# Patient Record
Sex: FEMALE | Race: WHITE
[De-identification: names, ages, dates, MRNs, and addresses within clinical notes are randomized per-mention and may not be internally consistent; named-entity substitution may affect disease eponyms.]

---

## 2017-04-14 ENCOUNTER — HOSPITAL ENCOUNTER (OUTPATIENT)
Dept: HOSPITAL 45 - C.MRI | Age: 52
Discharge: HOME | End: 2017-04-14
Attending: FAMILY MEDICINE
Payer: COMMERCIAL

## 2017-04-14 DIAGNOSIS — R92.2: Primary | ICD-10-CM

## 2017-04-17 NOTE — MAMMOGRAPHY REPORT
BREAST MRI OF BOTH BREASTS : 4/14/2017

CLINICAL HISTORY: Here for short interval follow-up after benign MRI guided biopsy of the left breas
t August 2016.  





COMPARISON: Comparison is made to exams dated:  7/28/2016 breast MRI, 8/24/2016 MRI biopsy, 7/18/201
6 mammogram, 7/13/2016 mammogram, 5/30/2013 mammogram, and 3/14/2012 mammogram - [facility].   



Technique: The patient was placed prone in a dedicated breast imaging coil.  Precontrast axial T1-we
ighted, axial T2-weighted fat saturation, and axial T1-weighted fat saturation images were obtained.
  After the administration of 9 mL of Gadavist IV contrast, sequential T1-weighted fat saturation im
ages were obtained.  Subtraction images were obtained of the dynamic contrast enhanced sequences, an
d 3-D reformations were performed.  The [employer] software was used for kinetic analysis.



Findings:



There is mild background parenchymal enhancement involving bilateral breasts.  Clip artifact is seen
 within the left upper outer quadrant far superiorly at the site of prior benign MRI guided biopsy. 
 The biopsied linear area of enhancement is no longer evident.  The remainder of both breasts demons
trate no suspicious enhancing masses or other suspicious findings.  There are multiple small enhanci
ng foci seen scattered throughout both breasts, which are considered benign given the multiplicity a
nd bilaterality and are felt to represent normal background parenchymal enhancement.  



There is no evidence of axillary adenopathy.  The chest wall structures are negative.  Visualized ex
tremely soft tissues are grossly unremarkable.





IMPRESSION: ACR BI-RADS CATEGORY 2: BENIGN 

No MRI evidence of malignancy in either breast.  The previously seen linear area of enhancement in t
he left upper outer quadrant is no longer evident; this was previously biopsied and yielded benign p
athology.



Return to annual mammogram screening schedule is recommended, due July 2017.  







Lovely Ugarte M.D.  

/:4/15/2017 12:59:13  



Imaging Technologist: MRI Technologist, [facility]

letter sent: Normal 1/2  

BI-RADS Code: ACR BI-RADS Category 2: Benign

## 2017-06-28 ENCOUNTER — HOSPITAL ENCOUNTER (OUTPATIENT)
Dept: HOSPITAL 45 - C.RDSM | Age: 52
Discharge: HOME | End: 2017-06-28
Attending: FAMILY MEDICINE
Payer: COMMERCIAL

## 2017-06-28 DIAGNOSIS — M79.89: ICD-10-CM

## 2017-06-28 DIAGNOSIS — M79.645: Primary | ICD-10-CM

## 2017-06-28 NOTE — DIAGNOSTIC IMAGING REPORT
LEFT THIRD FINGER RADIOGRAPHS



CLINICAL HISTORY: Left third finger dislocation.    



COMPARISON: None



FINDINGS:  Alignment of the left third finger is anatomic. There is moderate

soft tissue swelling at the level of the PIP joint of the third digit. No

fracture is identified. 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

interphalangeal joint of the left third finger.



IMPRESSION: 



1. No acute fracture.



2. Anatomic alignment of the left third finger.



3. Moderate soft tissue swelling at the level of the PIP joint of the left third

finger.







Electronically signed by:  Jose Parkinson M.D.

6/28/2017 9:38 AM



Dictated Date/Time:  6/28/2017 9:37 AM

## 2017-08-08 ENCOUNTER — HOSPITAL ENCOUNTER (OUTPATIENT)
Dept: HOSPITAL 45 - C.MAMM | Age: 52
Discharge: HOME | End: 2017-08-08
Attending: FAMILY MEDICINE
Payer: COMMERCIAL

## 2017-08-08 DIAGNOSIS — Z12.31: Primary | ICD-10-CM

## 2017-08-09 NOTE — MAMMOGRAPHY REPORT
BILATERAL DIGITAL SCREENING MAMMOGRAM TOMOSYNTHESIS WITH CAD: 8/8/2017

CLINICAL HISTORY: Routine screening.  Patient has no complaints.  





TECHNIQUE:  Breast tomosynthesis in addition to standard 2D mammography was performed. Current study 
was also evaluated with a Computer Aided Detection (CAD) system.  



COMPARISON: Comparison is made to exams dated:  4/14/2017 breast MRI, 8/24/2016 mammogram, 8/24/2016 
MRI biopsy, 7/28/2016 breast MRI, 7/18/2016 mammogram, and 7/13/2016 mammogram - [facility].   



BREAST COMPOSITION:  The tissue of both breasts is heterogeneously dense, which may obscure small mas
ses.  



FINDINGS:  There is a stable dumbbell-shaped metallic biopsy marker in the left upper outer posterior
 breast.  No new suspicious mass, architectural distortion or cluster of microcalcifications is seen.
  



IMPRESSION:  ACR BI-RADS CATEGORY 1: NEGATIVE

There is no mammographic evidence of malignancy. A 1 year screening mammogram is recommended.  The pa
tient will receive written notification of the results.  





Approximately 10% of breast cancers are not detected with mammography. A negative mammographic report
 should not delay biopsy if a clinically suggestive mass is present.



Ashley Toledo M.D.          

ay/:8/8/2017 18:13:49  



Imaging Technologist: Jennifer MAXWELL(R)(M), [facility]

letter sent: Normal 1/2  

BI-RADS Code: ACR BI-RADS Category 1: Negative

## 2018-08-13 ENCOUNTER — HOSPITAL ENCOUNTER (OUTPATIENT)
Dept: HOSPITAL 45 - C.MAMM | Age: 53
Discharge: HOME | End: 2018-08-13
Attending: FAMILY MEDICINE
Payer: COMMERCIAL

## 2018-08-13 DIAGNOSIS — Z12.31: Primary | ICD-10-CM

## 2018-08-14 NOTE — MAMMOGRAPHY REPORT
BILATERAL DIGITAL SCREENING MAMMOGRAM TOMOSYNTHESIS WITH CAD: 8/13/2018

CLINICAL HISTORY: Routine screening. Patient has no complaints.  





TECHNIQUE: The study was acquired using full field digital technology and interpreted from soft copy.
 Breast tomosynthesis in addition to standard 2D mammography was performed. Current study was also ev
aluated with a Computer Aided Detection (CAD) system.  



COMPARISON: Comparison is made to exams dated:  8/8/2017 mammogram, 8/24/2016 mammogram, 7/18/2016 ma
mmogram, 7/13/2016 mammogram, 8/24/2016 MRI biopsy, and 7/28/2016 breast MRI - [facility].   

BREAST COMPOSITION: The tissue of both breasts is heterogeneously dense, which may obscure small mass
es.  



FINDINGS: There is a stable metallic biopsy marker clip in the left upper outer quadrant. No suspicio
us mass, architectural distortion or cluster of microcalcifications is seen.  



IMPRESSION: ACR BI-RADS CATEGORY 1: NEGATIVE

There is no mammographic evidence of malignancy. A 1 year screening mammogram is recommended.(08/14/2
019)  The patient will receive written notification of the results.  





Some breast cancers are not detected with mammography. A negative mammographic report should not bandar
y biopsy if a clinically suggestive mass is present.



Ashley Toledo M.D.          

ay/:8/13/2018 16:05:49  



Imaging Technologist: RT Jj(R)(M), [facility]

letter sent: Normal 1/2  

BI-RADS Code: ACR BI-RADS Category 1: Negative